# Patient Record
Sex: FEMALE | Race: WHITE | NOT HISPANIC OR LATINO
[De-identification: names, ages, dates, MRNs, and addresses within clinical notes are randomized per-mention and may not be internally consistent; named-entity substitution may affect disease eponyms.]

---
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
## 2024-04-11 NOTE — ED ADULT NURSE NOTE - NSHOSCREENINGQ1_ED_ALL_ED
Received labs from Murray Norwood RN. Mag 0.9, uric acid 12.0. Patient currently is receiving 2g of mag at [place]. Discussed with Dr. Briggs. Per MD, send allopurinol 300mg daily, come back tomorrow to see MD or APC with magnesium infusion. Gave orders to Diane Higgins RN who is patients nurse at  currently to relay information. Patient agreeable. Will send RX to patients preferred pharmacy.    No

## 2024-04-29 ENCOUNTER — NON-APPOINTMENT (OUTPATIENT)
Age: 19
End: 2024-04-29